# Patient Record
Sex: FEMALE | Race: WHITE | URBAN - METROPOLITAN AREA
[De-identification: names, ages, dates, MRNs, and addresses within clinical notes are randomized per-mention and may not be internally consistent; named-entity substitution may affect disease eponyms.]

---

## 2017-05-03 ENCOUNTER — OFFICE VISIT (OUTPATIENT)
Dept: ORTHOPEDIC SURGERY | Age: 22
End: 2017-05-03

## 2017-05-03 VITALS
SYSTOLIC BLOOD PRESSURE: 127 MMHG | BODY MASS INDEX: 22.15 KG/M2 | HEART RATE: 76 BPM | WEIGHT: 125 LBS | HEIGHT: 63 IN | DIASTOLIC BLOOD PRESSURE: 88 MMHG

## 2017-05-03 DIAGNOSIS — M79.605 LEFT LEG PAIN: ICD-10-CM

## 2017-05-03 DIAGNOSIS — M25.552 LEFT HIP PAIN: Primary | ICD-10-CM

## 2017-05-03 PROCEDURE — 99203 OFFICE O/P NEW LOW 30 MIN: CPT | Performed by: PHYSICIAN ASSISTANT

## 2017-05-03 PROCEDURE — 73590 X-RAY EXAM OF LOWER LEG: CPT | Performed by: PHYSICIAN ASSISTANT

## 2017-05-03 PROCEDURE — 73502 X-RAY EXAM HIP UNI 2-3 VIEWS: CPT | Performed by: PHYSICIAN ASSISTANT

## 2017-05-03 RX ORDER — DICLOFENAC SODIUM 75 MG/1
75 TABLET, DELAYED RELEASE ORAL 2 TIMES DAILY WITH MEALS
Qty: 60 TABLET | Refills: 0 | Status: SHIPPED | OUTPATIENT
Start: 2017-05-03 | End: 2017-06-21

## 2017-05-09 ENCOUNTER — HOSPITAL ENCOUNTER (OUTPATIENT)
Dept: PHYSICAL THERAPY | Facility: MEDICAL CENTER | Age: 22
Discharge: HOME | End: 2017-05-31
Admitting: PHYSICIAN ASSISTANT

## 2017-06-21 ENCOUNTER — OFFICE VISIT (OUTPATIENT)
Dept: ORTHOPEDIC SURGERY | Age: 22
End: 2017-06-21

## 2017-06-21 VITALS — HEIGHT: 63 IN | BODY MASS INDEX: 22.15 KG/M2 | WEIGHT: 125 LBS

## 2017-06-21 DIAGNOSIS — M70.62 GREATER TROCHANTERIC BURSITIS, LEFT: Primary | ICD-10-CM

## 2017-06-21 PROBLEM — M70.60 GREATER TROCHANTERIC BURSITIS: Status: ACTIVE | Noted: 2017-06-21

## 2017-06-21 PROCEDURE — 20611 DRAIN/INJ JOINT/BURSA W/US: CPT | Performed by: ORTHOPAEDIC SURGERY

## 2017-06-21 PROCEDURE — 99214 OFFICE O/P EST MOD 30 MIN: CPT | Performed by: ORTHOPAEDIC SURGERY

## 2022-11-18 ENCOUNTER — OFFICE VISIT (OUTPATIENT)
Dept: ORTHOPEDIC SURGERY | Age: 27
End: 2022-11-18
Payer: COMMERCIAL

## 2022-11-18 VITALS — BODY MASS INDEX: 22.15 KG/M2 | WEIGHT: 125 LBS | HEIGHT: 63 IN

## 2022-11-18 DIAGNOSIS — M79.672 LEFT FOOT PAIN: Primary | ICD-10-CM

## 2022-11-18 DIAGNOSIS — M25.561 ACUTE PAIN OF RIGHT KNEE: ICD-10-CM

## 2022-11-18 DIAGNOSIS — M22.41 RUNNER'S KNEE, RIGHT: ICD-10-CM

## 2022-11-18 PROCEDURE — 99204 OFFICE O/P NEW MOD 45 MIN: CPT | Performed by: ORTHOPAEDIC SURGERY

## 2022-11-18 RX ORDER — LIDOCAINE 50 MG/G
OINTMENT TOPICAL
COMMUNITY
Start: 2018-01-15 | End: 2022-11-18

## 2022-11-18 RX ORDER — DROSPIRENONE AND ETHINYL ESTRADIOL 0.03MG-3MG
1 KIT ORAL DAILY
COMMUNITY
Start: 2018-01-15 | End: 2022-11-18

## 2022-11-18 NOTE — PROGRESS NOTES
Jenn Kayleigh today for evaluation of 2 issues the first is her right knee and the second is her left foot. She is an avid runner. She had a hamstring injury in August and September and was able to return to running in October. She runs [employer] on October 9. She then rested and crosstraining up after running at the end of October. In early November she felt pain in her right knee and it feels like it locks or catches during her runs. It can be very very painful. Shortly thereafter her left foot began hurting. She has pain on the dorsum of the foot especially when wearing shoes. It feels very similar to an injury she had in August of last year that was diagnosed as bursitis and a \"prestress fracture\". She has changed her shoes and that seems to have helped some but when she goes back to her newer shoes, she has difficulty. She has been using KT tape. She is been seeing a chiropractor but continues to have difficulty. She is otherwise very healthy. 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 History: Patient's relevant past family, medical, and social history are reviewed as part of today's visit. ROS of pertinent positives and negatives as above; otherwise negative. General Exam:    Vitals: Height 5' 3\" (1.6 m), weight 125 lb (56.7 kg). Constitutional: Patient is adequately groomed with no evidence of malnutrition  Mental Status: The patient is oriented to time, place and person. The patient's mood and affect are appropriate. Gait:  Patient walks with normal gait and station. Lymphatic: The lymphatic examination bilaterally reveals all areas to be without enlargement or induration. Vascular: Examination reveals no swelling or calf tenderness. Peripheral pulses are palpable and 2+. Neurological: The patient has good coordination. There is no weakness or sensory deficit. Skin:    Head/Neck: inspection reveals no rashes, ulcerations or lesions.   Trunk:  inspection reveals no rashes, ulcerations or lesions. Right Lower Extremity: inspection reveals no rashes, ulcerations or lesions. Left Lower Extremity: inspection reveals no rashes, ulcerations or lesions. Examination of the bilateral hips reveals normal flexion and extension. There is no restriction in rotation. There is no tenderness to palpation anteriorly posteriorly or laterally. Left knee examination demonstrates no effusion. There is no tenderness to palpation over the medial or lateral joint line. There is no discomfort over the patellar tendon. There is no palpable popliteal cyst.  Sensation is intact. Range of motion is normal.  There is no patellofemoral crepitation. There is no instability to varus or valgus stress applied at 0, 30, 60, or 90Â° of flexion. There is no anterior or posterior drawer. Lachman examination is normal.    Right knee has no crepitation. She has irritability over the patella and pain with terminal extension but no effusion. Calf is soft. She is stable. Right foot exam is entirely normal.    Left foot has some very mild discomfort over the second metatarsal but no swelling and no bruising. Calf is soft. Neurologic and vascular exams are normal.  X-rays were obtained today in the office and interpreted by me. AP standing, PA flexed, and merchant views of the bilateral knees as well as a lateral of the right knee. These demonstrate: No bony abnormalities    Left foot x-rays obtained today in the office and interpreted by me AP, lateral and oblique views demonstrate: No bony abnormalities. I reviewed with her her MRI scan report from [facility] dated August 2021 which showed intermetatarsal bursitis and some soft tissue contusion but no bony abnormalities. Assessment: Right-sided runner's knee and left-sided dorsal foot pain. Plan: She is going to cross train. She will see physical therapy with an emphasis on quad strengthening.     She really only trains from a cardiovascular standpoint I think at this point coming off hamstring injury and being a high-level distance runner she is lacking quad strength which is leading to patellofemoral pain. She understands this and is in agreement with this plan. Follow-up with me in 6 weeks. Persistent pain at that time would warrant an MRI.

## 2022-11-30 ENCOUNTER — HOSPITAL ENCOUNTER (OUTPATIENT)
Dept: PHYSICAL THERAPY | Age: 27
Setting detail: THERAPIES SERIES
Discharge: HOME | End: 2022-11-30
Payer: COMMERCIAL

## 2022-11-30 PROCEDURE — 97530 THERAPEUTIC ACTIVITIES: CPT | Performed by: PHYSICAL THERAPIST

## 2022-11-30 PROCEDURE — 97110 THERAPEUTIC EXERCISES: CPT | Performed by: PHYSICAL THERAPIST

## 2022-11-30 PROCEDURE — 97161 PT EVAL LOW COMPLEX 20 MIN: CPT | Performed by: PHYSICAL THERAPIST

## 2022-11-30 NOTE — FLOWSHEET NOTE
[facility] a Department of [address]  Cedrick Oliveira [place] [address], [address]  Office: [phone]  Fax:  [phone]                                                      Physical Therapy Treatment Note/ Progress Report:      Date:  2022    Patient Name:  Andrew Reagan    :  1995  MRN: [id number]  Restrictions/Precautions:    Medical/Treatment Diagnosis Information:  Diagnosis: M22.41 (ICD-10-CM) - Runner's knee, right  Treatment Diagnosis: Right knee pain Z05.051  Insurance/Certification information:  PT Insurance Information: [facility]  Physician Information:  Referring Provider (secondary): Kimberly  Has the plan of care been signed (Y/N):        []  Yes  [x]  No     Date of Patient follow up with Physician: 3 Richard 2023      Is this a Progress Report:     []  Yes  [x]  No        If Yes:  Date Range for reporting period:  Beginning 2022  Ending    Progress report will be due (10 Rx or 30 days whichever is less): 28 Dec 2022             Visit # Insurance Allowable Auth Required   1 (8 elsewhere) 20 []  Yes []  No          Functional Scale: Edson Painter   Date assessed: 2022      Latex Allergy:  [x]NO      []YES  Preferred Language for Healthcare:   [x]English       []other:      Pain level:  See eval     SUBJECTIVE:  See eval    OBJECTIVE: See eval  Observation:   Test measurements:          Flexibility L R Comment   Hamstring      Gastroc      ITB      Quad                ROM PROM AROM Overpressure Comment    L R L R L R    Flexion          Extension                                  Strength L R Comment   Quad      Hamstring      Gastroc      Hip flexor      Hip ABD                          RESTRICTIONS/PRECAUTIONS: h/o pelvic stress fracture 2017; Covid- no long term effects; Pt is a vegan; h/o eating disorder; Pt does have  h/o eating disorder and h/o stress fracture.   She also has h/o amenorrhea for about 5 years.  She is vegan. All of these things could contribute to RED-S        Exercises/Interventions:     Exercise/Equipment Resistance and Repetitions Other comments   Stretching     Hamstring     Hip Flexion     ITB- Rope 3x:30    Grion     Quad     Inclined Calf     Towel Pull     Piriformis                    SLR     Supine 3x10 1# Slight quad lag noted   Prone     Abduction 3x10    Adducton     SLR+     clams Reviewed for HEP         Isometrics     Quad sets     Ball Squeezes     Glut sets Reviewed for HEP    Patellar Glides     Medial     Superior     Inferior          ROM     Passive     Active     Weight Shift     Hang Weights     Sheet Pulls     Ankle Pumps          CKC     Calf raises     Wall sits circuit 3x red  Wall sit with red loop x :30  1 band walk pillar to pillar    Step ups     1 leg stand     Squatting     CC TKE     Balance     Monster Walks     Bridging- SL with hip ABD 3x5    Triple threats     Stool Scoots     PRE     Extension  RANGE:   Flexion  RANGE:        Cable Column          Leg Press  RANGE:        Bike     Treadmill                  Therapeutic Exercise and NMR EXR  [x] ([id number]) Provided verbal/tactile cueing for activities related to strengthening, flexibility, endurance, ROM for improvements in LE, proximal hip, and core control with self care, mobility, lifting, ambulation. [x] (81674) Provided verbal/tactile cueing for activities related to improving balance, coordination, kinesthetic sense, posture, motor skill, proprioception  to assist with LE, proximal hip, and core control in self care, mobility, lifting, ambulation and eccentric single leg control.      NMR and Therapeutic Activities:    [x] (68709 or 55974) Provided verbal/tactile cueing for activities related to improving balance, coordination, kinesthetic sense, posture, motor skill, proprioception and motor activation to allow for proper function of core, proximal hip and LE with self care and ADLs  [x] (39603) Gait Re-education- Provided training and instruction to the patient for proper LE, core and proximal hip recruitment and positioning and eccentric body weight control with ambulation re-education including up and down stairs     Home Exercise Program:    [x] (27382) Reviewed/Progressed HEP activities related to strengthening, flexibility, endurance, ROM of core, proximal hip and LE for functional self-care, mobility, lifting and ambulation/stair navigation   [x] (75087)Reviewed/Progressed HEP activities related to improving balance, coordination, kinesthetic sense, posture, motor skill, proprioception of core, proximal hip and LE for self care, mobility, lifting, and ambulation/stair navigation      Access Code: [email or web address]  URL: [email or web address]  Date: 11/30/2022  Prepared by: Franko Clark Cessna    Exercises  Supine ITB Stretch with Strap - 2 x daily - 7 x weekly - 3-5 reps - 30 hold  Sidelying IT Band Foam Roll Mobilization - 1-2 x daily - 7 x weekly  Supine Active Straight Leg Raise - 1-2 x daily - 7 x weekly - 3 sets - 10 reps  Sidelying Hip Abduction - 1-2 x daily - 7 x weekly - 3 sets - 10 reps  Clamshell with Resistance - 1-2 x daily - 7 x weekly - 3 sets - 10 reps  Single Leg Bridge - 7 x weekly - 3 sets - 10 reps  Wall Squat - 1 x daily - 3-7 x weekly - 3-5 reps - 30-60 hold  Sidestepping in Squat with Resistance and Arms Forward - 1 x daily - 3-7 x weekly - 3 sets - 10 reps      Manual Treatments:  PROM / STM / Oscillations-Mobs:  G-I, II, III, IV (PA's, Inf., Post.)  [x] (57957) Provided manual therapy to mobilize LE, proximal hip and/or LS spine soft tissue/joints for the purpose of modulating pain, promoting relaxation,  increasing ROM, reducing/eliminating soft tissue swelling/inflammation/restriction, improving soft tissue extensibility and allowing for proper ROM for normal function with self care, mobility, lifting and ambulation.      Other:  Patient education on PT and plan of care including diagnosis, prognosis, treatment goals and options. Patient agrees with discussed POC and treatment and is aware of rehab process. Pt was also educated on clinic layout and use of modalities. PT gave pt business card to call if any questions. Modalities: declined    Charges:   Timed Code Treatment Minutes: 35'   Total Treatment Minutes: 75     [x] EVAL (LOW) 19245   [] EVAL (MOD) 79911   [] EVAL (HIGH) 24776   [] RE-EVAL   [x] BT(56080) x   1  [] IONTO  [] NMR (54271) x     [] VASO  [] Manual (46460) x      [] Other:  [x] TA x  1    [] Mech Traction (65926)  [] ES(attended) (08641)      [] ES (un) (38828):     GOALS:   Patient stated goal: return to running without c/o pain    [] Progressing: [] Met: [] Not Met: [] Adjusted     Therapist goals for Patient:   Short Term Goals: To be achieved in: 2 weeks  1. Independent in HEP and progression per patient tolerance, in order to prevent re-injury. [] Progressing: [] Met: [] Not Met: [] Adjusted   2. Pt will report pain at worst less than or equal to 5/10. [] Progressing: [] Met: [] Not Met: [] Adjusted     Long Term Goals: To be achieved in: 8 weeks  1. Pt will demo a score 85 or better for FOTO to assist with reaching prior level of function. [] Progressing: [] Met: [] Not Met: [] Adjusted  2. Patient will demonstrate negative Lili's to allow for proper joint functioning as indicated by patients Functional Deficits. [] Progressing: [] Met: [] Not Met: [] Adjusted  3. Patient will demonstrate an increase in strength to hip flex, hip ABD, and knee flex and ext strength 4+ to allow for proper functional mobility as indicated by patients functional deficits. [] Progressing: [] Met: [] Not Met: [] Adjusted  4. Patient will return to running 5 miles continuously without c/o pain. [] Progressing: [] Met: [] Not Met: [] Adjusted  5. Pt will report pain at worst less than or equal to 2/10. [] Progressing: [] Met: [] Not Met: [] Adjusted       6.  Pt will amb up/down 1 FOS with reciprocal gt without c/o pain. [] Progressing: [] Met: [] Not Met: [] Adjusted    Overall Progression Towards Functional goals/ Treatment Progress Update:  [] Patient is progressing as expected towards functional goals listed. [] Progression is slowed due to complexities/Impairments listed. [] Progression has been slowed due to co-morbidities. [x] Plan just implemented, too soon to assess goals progression <30days   [] Goals require adjustment due to lack of progress  [] Patient is not progressing as expected and requires additional follow up with physician  [] Other    Prognosis for POC: [x] Good [] Fair  [] Poor      Patient requires continued skilled intervention: [x] Yes  [] No    Treatment/Activity Tolerance:  [x] Patient able to complete treatment  [] Patient limited by fatigue  [] Patient limited by pain     [] Patient limited by other medical complications  [] Other: Pt is a 33 y/o female presenting with diagnosis of right runner's knee from the MD.  Clinically, the pt presents with decreased ROM, decreased strength, decreased function, and increased pain consistent with the MD diagnosis. The pt would benefit from skilled PT to return to [facility]. Pt has had a series of running related injuries recently. She has been trying to cross train. She does have h/o an eating disorder and is vegan. She does not report altered cycles currently, but she does report amenorrhea of about 5 years. She also reports h/o pelvic stress fracture. This could all contribute to RED-S. We did discuss utilizing a nutritionist/dietician, which I did say I could connect her with. Pt does demo significant weakness and decreased endurance particularly in hip ABD. She was challenged with the circuit today. She does have a deductible remaining, which could affect her ability to come to PT. We did discuss options for this as well. We did review insurance benefits.   All of pt's questions were answered. Pt was agreeable. PLAN: If pt doesn't return, this note can be considered a D/C note.   See eval  [] Continue per plan of care [] Alter current plan (see comments above)  [x] Plan of care initiated [] Hold pending MD visit [] Discharge            Electronically signed by:  Natalie Hogan PT, DPT, Board-Certified Clinical Specialist in Orthopaedic Physical Therapy [id number]

## 2022-11-30 NOTE — PLAN OF CARE
[facility] a Department of [address]  [place] [address], [address]  Office: [phone]  Fax:  [phone]                                                          Physical Therapy Certification    Dear Referring Provider (secondary): Kimberly,    We had the pleasure of evaluating the following patient for physical therapy services at [address] [place] [address]. A summary of our findings can be found in the initial assessment below. This includes our plan of care. If you have any questions or concerns regarding these findings, please do not hesitate to contact me at the office phone number checked above. Thank you for the referral.       Physician Signature:_______________________________Date:__________________  By signing above (or electronic signature), therapists plan is approved by physician      Patient: Andrew Reagan   : 1995   MRN: [id number]  Referring Physician: Referring Provider (secondary): Kimberly      Evaluation Date: 2022      Medical Diagnosis Information:  Diagnosis: M22.41 (ICD-10-CM) - Runner's knee, right   Treatment Diagnosis: Right knee pain M25.561                                           Precautions/ Contra-indications: h/o pelvic stress fracture ; Covid- no long term effects; Pt is a vegan; h/o eating disorder  C-SSRS Triggered by Intake questionnaire (Past 2 wk assessment):   [x] No, Questionnaire did not trigger screening.   [] Yes, Patient intake triggered further evaluation      [] C-SSRS Screening completed  [] PCP notified via Plan of Care  [] Emergency services notified   Latex Allergy:  [x]NO      []YES  Preferred Language for Healthcare:   [x]English       []other:    SUBJECTIVE: Patient stated complaint: Pt reports that she is prone to injury. She started running marathons in 2019. She had some glut stuff last year. Then her left foot was irritated. She was put In a boot in Aug.  She trained in 6 wks for a marathon. She did [place]. She then started to switch to get 5K time improved. She was going to run [employer] back to back. In late Aug, she had a deep right hamstring pull. She was starting a cut down run, and when she switched to drop her speed to race pace the pain started. She had that tx with DN at [facility]. She then had deep pain in her psoas before the hamstring. She then didn't run a whole lot. She got Covid before [place], and didn't run that. Then she ran [email or web address]. She ran a 5K (6:20 pace). Then she was walking to yoga, and had pain under her patella. She did Pilates fine. Then when she got to 2.5 mi, she felt her knee would lock up. It felt really stiff. She backed off, and then tested it. She had x-rays. She was having left foot pain, and then it improved as she has rested. She uses [employer] for training runs. She can lift, squat, [place] knee ext, stairs are fine when it's not flared up. She did a 4.5 mi race at 6:20 pace, but later in the day she was sore in the front of the knee. Then 2 days later, she couldn't run. She is currently running no more than 20 mi/wk. She did a 10K progression run without problems. She then didn't have pain for 5 days afterwards. She has been strength training 3x/wk. She does arms/shoulders- push ups/chin ups. Heavier lunge/squat days, leg press, OK[place] knee ext. This is usually 22' and then she will get on the elliptical.  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 Relevant Medical History:h/o pelvic stress fracture 2017; Covid- no long term effects; Pt is a vegan; h/o eating disorder  Functional Scale:  [id number]    Pain Scale: 0/10  Easing factors: not running, KT- seems to help, ice- helps, cross training. She does tape her knee for running. Provocative factors: running, particularly after running.   This was usually around 2.5 mi- locked up. 8/10 pain at worst.     Type: [] Constant   [x] Intermittent  [] Radiating [] Localized [] other:     Numbness/Tingling: none    Functional Limitations/Impairments: [] Sitting [] Standing [] Walking    [] Squatting/bending  [x] Stairs  -unless it's irritated         [] ADL's  [] Transfers [x] Sports/Recreation [] Other:    Occupation/School:      Living Status/Prior Level of Function: Independent with ADLs and IADLs. She is running [employer] in April.    (insert highest prior level of function)    40 mi/wk running. Works up to 60-65 mi/wk at her peak. Easy run pace is 8:15-8:30.  5K 6min/pace or less. Main goal is full. She has 3 marathons on next year ([place], [place], [place]). OBJECTIVE:     Joint mobility:    [x] Normal    [] Hypo   [] Hyper    Palpation: -TTP     Functional Mobility/Transfers: see above    Posture: FWD head    Bandages/Dressings/Incisions: NA- pt does have KT tape on    Gait: (include devices/WB status) WNL        Flexibility L R Comment   Hamstring      Gastroc      ITB  +    Quad                ROM PROM AROM Overpressure Comment    L R L R L R    Flexion   149 143      Extension   Hyper 2 Hyper 4                              Strength L R Comment   Quad 4+ 4+    Hamstring 4+ 4+    Gastroc      Hip flexor 4- 3+    Hip ABD 3+ 3+                  Orthopedic Special Tests:   Special Test Results/Comment   Meniscal Click    Crepitus    Flexion Test    Valgus Laxity    Varus Laxity    Lachmans    Drop Back    Homans    duckwal Normal without c/o pain. Girth L R   Mid Patella     Suprapatellar     5cm above     15cm above                                [x] Patient history, allergies, meds reviewed. Medical chart reviewed. See intake form. Review Of Systems (ROS):  [x]Performed Review of systems (Integumentary, CardioPulmonary, Neurological) by intake and observation. Intake form has been scanned into medical record.  Patient has been instructed to contact their primary care physician regarding ROS issues if not already being addressed at this time. Co-morbidities/Complexities (which will affect course of rehabilitation):   []None           Arthritic conditions   []Rheumatoid arthritis (M05.9)  []Osteoarthritis (M19.91)   Cardiovascular conditions   []Hypertension (I10)  []Hyperlipidemia (E78.5)  []Angina pectoris (I20)  []Atherosclerosis (I70)   Musculoskeletal conditions   []Disc pathology   []Congenital spine pathologies   []Prior surgical intervention  []Osteoporosis (M81.8)  []Osteopenia (M85.8)   Endocrine conditions   []Hypothyroid (E03.9)  []Hyperthyroid Gastrointestinal conditions   []Constipation (L97.26)   Metabolic conditions   []Morbid obesity (E66.01)  []Diabetes type 1(E10.65) or 2 (E11.65)   []Neuropathy (G60.9)     Pulmonary conditions   []Asthma (J45)  []Coughing   []COPD (J44.9)   Psychological Disorders  []Anxiety (F41.9)  []Depression (F32.9)   []Other:   [x]Other: Pt does have  h/o eating disorder and h/o stress fracture. She also has h/o amenorrhea for about 5 years. She is vegan. All of these things could contribute to RED-S          Barriers to/and or personal factors that will affect rehab potential:              [x]Age  []Sex              []Motivation/Lack of Motivation                        [x]Co-Morbidities              []Cognitive Function, education/learning barriers              []Environmental, home barriers              []profession/work barriers  [x]past PT/medical experience  []other:  Justification: Pt has had a series of running related injuries recently. She has been trying to cross train. She does have h/o an eating disorder and is vegan. She does not report altered cycles currently, but she does report amenorrhea of about 5 years. She also reports h/o pelvic stress fracture. This could all contribute to RED-S.   We did discuss utilizing a nutritionist/dietician, which I did say I could connect her with. Pt does demo significant weakness and decreased endurance particularly in hip ABD. She was challenged with the circuit today. She does have a deductible remaining, which could affect her ability to come to PT. We did discuss options for this as well. Falls Risk Assessment (30 days):   [x] Falls Risk assessed and no intervention required. [] Falls Risk assessed and Patient requires intervention due to being higher risk   TUG score (>12s at risk):     [] Falls education provided, including         ASSESSMENT:   Functional Impairments:     []Noted lumbar/proximal hip/LE joint hypomobility   []Decreased LE functional ROM   [x]Decreased core/proximal hip strength and neuromuscular control   [x]Decreased LE functional strength   []Reduced balance/proprioceptive control   []other:      Functional Activity Limitations (from functional questionnaire and intake)   []Reduced ability to tolerate prolonged functional positions   []Reduced ability or difficulty with changes of positions or transfers between positions   []Reduced ability to maintain good posture and demonstrate good body mechanics with sitting, bending, and lifting   []Reduced ability to sleep   [] Reduced ability or tolerance with driving and/or computer work   []Reduced ability to perform lifting, carrying tasks   []Reduced ability to squat   []Reduced ability to forward bend   []Reduced ability to ambulate prolonged functional periods/distances/surfaces   [x]Reduced ability to ascend/descend stairs- after runs   [x]Reduced ability to run, hop, cut or jump   []other:    Participation Restrictions   []Reduced participation in self care activities   []Reduced participation in home management activities   []Reduced participation in work activities   []Reduced participation in social activities. [x]Reduced participation in sport/recreation activities.     Classification :    []Signs/symptoms consistent with post-surgical status including decreased ROM, strength and function. []Signs/symptoms consistent with joint sprain/strain   []Signs/symptoms consistent with patella-femoral syndrome   []Signs/symptoms consistent with knee OA/hip OA   []Signs/symptoms consistent with internal derangement of knee/Hip   []Signs/symptoms consistent with functional hip weakness/NMR control      []Signs/symptoms consistent with tendinitis/tendinosis    []signs/symptoms consistent with pathology which may benefit from Dry needling      [x]other: Pt is a 31 y/o female presenting with diagnosis of right runner's knee from the MD.  Clinically, the pt presents with decreased ROM, decreased strength, decreased function, and increased pain consistent with the MD diagnosis. The pt would benefit from skilled PT to return to [facility]. Pt has had a series of running related injuries recently. She has been trying to cross train. She does have h/o an eating disorder and is vegan. She does not report altered cycles currently, but she does report amenorrhea of about 5 years. She also reports h/o pelvic stress fracture. This could all contribute to RED-S. We did discuss utilizing a nutritionist/dietician, which I did say I could connect her with. Pt does demo significant weakness and decreased endurance particularly in hip ABD. She was challenged with the circuit today. She does have a deductible remaining, which could affect her ability to come to PT. We did discuss options for this as well. We did review insurance benefits. All of pt's questions were answered. Pt was agreeable.        Prognosis/Rehab Potential:      []Excellent   [x]Good    []Fair   []Poor    Tolerance of evaluation/treatment:    []Excellent   [x]Good    []Fair   []Poor    PLAN  Frequency/Duration:  1-2 days per week for 6-8 Weeks:  Interventions:  [x]  Therapeutic exercise including: strength training, ROM, for Lower extremity and core   [x]  NMR activation and proprioception for LE, Glutes and Core [x]  Manual therapy as indicated for LE, Hip and spine to include: Dry Needling/IASTM, STM, PROM, Gr I-IV mobilizations, manipulation. [x] Modalities as needed that may include: thermal agents, E-stim, Biofeedback, US, iontophoresis as indicated  [x] Patient education on joint protection, postural re-education, activity modification, progression of HEP. HEP instruction: (see scanned forms)       GOALS:   Patient stated goal: return to running without c/o pain    [] Progressing: [] Met: [] Not Met: [] Adjusted    Therapist goals for Patient:   Short Term Goals: To be achieved in: 2 weeks  1. Independent in HEP and progression per patient tolerance, in order to prevent re-injury. [] Progressing: [] Met: [] Not Met: [] Adjusted   2. Pt will report pain at worst less than or equal to 5/10. [] Progressing: [] Met: [] Not Met: [] Adjusted    Long Term Goals: To be achieved in: 8 weeks  1. Pt will demo a score 85 or better for FOTO to assist with reaching prior level of function. [] Progressing: [] Met: [] Not Met: [] Adjusted  2. Patient will demonstrate negative Lili's to allow for proper joint functioning as indicated by patients Functional Deficits. [] Progressing: [] Met: [] Not Met: [] Adjusted  3. Patient will demonstrate an increase in strength to hip flex, hip ABD, and knee flex and ext strength 4+ to allow for proper functional mobility as indicated by patients functional deficits. [] Progressing: [] Met: [] Not Met: [] Adjusted  4. Patient will return to running 5 miles continuously without c/o pain. [] Progressing: [] Met: [] Not Met: [] Adjusted  5. Pt will report pain at worst less than or equal to 2/10. [] Progressing: [] Met: [] Not Met: [] Adjusted   6. Pt will amb up/down 1 FOS with reciprocal gt without c/o pain.   [] Progressing: [] Met: [] Not Met: [] Adjusted    Physical Therapy Evaluation Complexity Justification  [x] A history of present problem with:  [] no personal factors and/or comorbidities that impact the plan of care;  []1-2 personal factors and/or comorbidities that impact the plan of care  [x]3 personal factors and/or comorbidities that impact the plan of care  [x] An examination of body systems using standardized tests and measures addressing any of the following: body structures and functions (impairments), activity limitations, and/or participation restrictions;:  [] a total of 1-2 or more elements   [] a total of 3 or more elements   [x] a total of 4 or more elements   [x] A clinical presentation with:  [x] stable and/or uncomplicated characteristics   [] evolving clinical presentation with changing characteristics  [] unstable and unpredictable characteristics;   [x] Clinical decision making of [x] low, [] moderate, [] high complexity using standardized patient assessment instrument and/or measurable assessment of functional outcome.     [x] EVAL (LOW) 22279 (typically 20 minutes face-to-face)  [] EVAL (MOD) 42305 (typically 30 minutes face-to-face)  [] EVAL (HIGH) 91796 (typically 45 minutes face-to-face)  [] RE-EVAL [id number]    Electronically signed by:  Yoan Biswas, PT, DPT, Board-Certified Clinical Specialist in [address] [place] [address], [phone]

## 2022-12-09 ENCOUNTER — HOSPITAL ENCOUNTER (OUTPATIENT)
Dept: PHYSICAL THERAPY | Age: 27
Setting detail: THERAPIES SERIES
Discharge: HOME | End: 2022-12-09
Payer: COMMERCIAL

## 2022-12-09 PROCEDURE — 97112 NEUROMUSCULAR REEDUCATION: CPT | Performed by: PHYSICAL THERAPIST

## 2022-12-09 PROCEDURE — 97110 THERAPEUTIC EXERCISES: CPT | Performed by: PHYSICAL THERAPIST

## 2022-12-09 PROCEDURE — 97530 THERAPEUTIC ACTIVITIES: CPT | Performed by: PHYSICAL THERAPIST

## 2022-12-09 NOTE — FLOWSHEET NOTE
100 [facility] a Department of [address]  Nicole [address], [address]  Office: [phone]  Fax:  [phone]                                                      Physical Therapy Treatment Note/ Progress Report:      Date:  2022    Patient Name:  Jose Zarate    :  1995  MRN: [id number]  Restrictions/Precautions:    Medical/Treatment Diagnosis Information:  Diagnosis: M22.41 (ICD-10-CM) - Runner's knee, right  Treatment Diagnosis: Right knee pain B54.475  Insurance/Certification information:  PT Insurance Information: [facility]  Physician Information:  Referring Provider (secondary): Kimberly  Has the plan of care been signed (Y/N):        []  Yes  [x]  No     Date of Patient follow up with Physician: 3 Richard 2023      Is this a Progress Report:     []  Yes  [x]  No        If Yes:  Date Range for reporting period:  Beginning 2022  Ending    Progress report will be due (10 Rx or 30 days whichever is less): 28 Dec 2022             Visit # Insurance Allowable Auth Required    (8 elsewhere) 20  6 visits approved through 22 [x]  Yes []  No          Functional Scale: Coit Mons   Date assessed: 2022      Latex Allergy:  [x]NO      []YES  Preferred Language for Healthcare:   [x]English       []other:      Pain level:  0/10     SUBJECTIVE:  All her exercises yesterday hurt. She did the FR last.  After she was done with that, her knee felt a little better. The last 2 days were painful. She did a cardio workout on Tuesday that involved some plyos. Then the next day she tried to run, and it hurt starting at the first mile. Yesterday, she did her running group, and it hurt the whole time. She is doing the HEP. A compression sleeve hurt to have on her patella. So she went back to the tape. Her hamstring started hurting the other day when she was lifting. She scraped it and iced it.   It feels a little better. OBJECTIVE: See eval  Observation:   Test measurements:          Flexibility L R Comment   Hamstring      Gastroc      ITB      Quad                ROM PROM AROM Overpressure Comment    L R L R L R    Flexion          Extension                                  Strength L R Comment   Quad      Hamstring      Gastroc      Hip flexor      Hip ABD                          RESTRICTIONS/PRECAUTIONS: h/o pelvic stress fracture 2017; Covid- no long term effects; Pt is a vegan; h/o eating disorder; Pt does have  h/o eating disorder and h/o stress fracture. She also has h/o amenorrhea for about 5 years. She is vegan. All of these things could contribute to RED-S        Exercises/Interventions:     Exercise/Equipment Resistance and Repetitions Other comments   Stretching     Hamstring     Hip Flexion 3x:30    ITB- Rope 3x:30    Grion     Quad 3x:30    Inclined Calf     Towel Pull     Piriformis     Foam roll Beginning and end of tx- lateral hip, hip, and ITB              SLR     Supine 3x10 2# Slight quad lag noted   Prone 3x10 bilaterally off edge of table    Abduction 3x10 2#    Adducton     SLR+ ABD- :30 hold, 30x pumps, ABC     clams With half kneel side plank- 10x5# on right sidelying. 2x10 5# on left         Isometrics     [employer] Squeezes     Glut sets 5x ea way with release of right then left.     Patellar Glides     Medial     Superior     Inferior          ROM     Passive     Active     Weight Shift     Hang Weights     Sheet Pulls     Ankle Pumps          CKC     Calf raises     Wall sits circuit 3x red  Wall sit with red loop x :30  1 band walk pillar to pillar    Step ups     1 leg stand     Airplanes 5x ea way    Squatting     CC TKE     Balance     Monster Walks     Bridging- SL with hip ABD    Triple threats     Stool Scoots     Leg cycles 15x push away only    Side plank with hip ABD 5x ea side         PRE     Extension  RANGE:   Flexion 3x10 25# ECL RANGE:        [employer] Column          Leg Press  RANGE:        Bike     Treadmill                  Therapeutic Exercise and NMR EXR  [x] (46089) Provided verbal/tactile cueing for activities related to strengthening, flexibility, endurance, ROM for improvements in LE, proximal hip, and core control with self care, mobility, lifting, ambulation. [x] (63238) Provided verbal/tactile cueing for activities related to improving balance, coordination, kinesthetic sense, posture, motor skill, proprioception  to assist with LE, proximal hip, and core control in self care, mobility, lifting, ambulation and eccentric single leg control. NMR and Therapeutic Activities:    [x] (34255 or 21473) Provided verbal/tactile cueing for activities related to improving balance, coordination, kinesthetic sense, posture, motor skill, proprioception and motor activation to allow for proper function of core, proximal hip and LE with self care and ADLs  [x] (26712) Gait Re-education- Provided training and instruction to the patient for proper LE, core and proximal hip recruitment and positioning and eccentric body weight control with ambulation re-education including up and down stairs     Home Exercise Program:    [x] (77804) Reviewed/Progressed HEP activities related to strengthening, flexibility, endurance, ROM of core, proximal hip and LE for functional self-care, mobility, lifting and ambulation/stair navigation   [x] (59486)Reviewed/Progressed HEP activities related to improving balance, coordination, kinesthetic sense, posture, motor skill, proprioception of core, proximal hip and LE for self care, mobility, lifting, and ambulation/stair navigation      Access Code: [email or web address]  URL: [email or web address]  Date: 12/09/2022  Prepared by: Brie Stewart    Exercises  Supine ITB Stretch with Strap - 2 x daily - 7 x weekly - 3-5 reps - 30 hold  Standing Hip Flexor Stretch - 2 x daily - 7 x weekly - 3 reps - 30 hold  Prone Quadriceps Stretch with Strap - 2 x daily - 7 x weekly - 3 reps - 30 hold  Sidelying IT Band Foam Roll Mobilization - 1-2 x daily - 7 x weekly  Supine Active Straight Leg Raise - 1-2 x daily - 7 x weekly - 3 sets - 10 reps  Sidelying Hip Abduction - 1-2 x daily - 7 x weekly - 3 sets - 10 reps  Clamshell with Resistance - 1-2 x daily - 7 x weekly - 3 sets - 10 reps  Single Leg Bridge - 7 x weekly - 3 sets - 10 reps  Wall Squat - 1 x daily - 3-7 x weekly - 3-5 reps - 30-60 hold  Sidestepping in Squat with Resistance and Arms Forward - 1 x daily - 3-7 x weekly - 3 sets - 10 reps  Side Plank on Elbow with Hip Abduction - 1 x daily - 3-7 x weekly - 1-3 sets - 5-10 reps        Manual Treatments:  PROM / STM / Oscillations-Mobs:  G-I, II, III, IV (PA's, Inf., Post.)  [x] (97215) Provided manual therapy to mobilize LE, proximal hip and/or LS spine soft tissue/joints for the purpose of modulating pain, promoting relaxation,  increasing ROM, reducing/eliminating soft tissue swelling/inflammation/restriction, improving soft tissue extensibility and allowing for proper ROM for normal function with self care, mobility, lifting and ambulation. Other:        Modalities: ice to go    Charges:   Timed Code Treatment Minutes: 48'   Total Treatment Minutes: 65     [] EVAL (LOW) 46515   [] EVAL (MOD) 31681   [] EVAL (HIGH) 09837   [] RE-EVAL   [x] ZU(99377) x  2  [] IONTO  [x] NMR (55207) x 1    [] VASO  [] Manual (62016) x      [] Other:  [x] TA x  1    [] Mech Traction (95607)  [] ES(attended) (96085)      [] ES (un) (63413):     GOALS:   Patient stated goal: return to running without c/o pain    [] Progressing: [] Met: [] Not Met: [] Adjusted     Therapist goals for Patient:   Short Term Goals: To be achieved in: 2 weeks  1. Independent in HEP and progression per patient tolerance, in order to prevent re-injury. [] Progressing: [] Met: [] Not Met: [] Adjusted   2. Pt will report pain at worst less than or equal to 5/10.   [] Progressing: [] Met: [] Not Met: [] Adjusted     Long Term Goals: To be achieved in: 8 weeks  1. Pt will demo a score 85 or better for FOTO to assist with reaching prior level of function. [] Progressing: [] Met: [] Not Met: [] Adjusted  2. Patient will demonstrate negative Lili's to allow for proper joint functioning as indicated by patients Functional Deficits. [] Progressing: [] Met: [] Not Met: [] Adjusted  3. Patient will demonstrate an increase in strength to hip flex, hip ABD, and knee flex and ext strength 4+ to allow for proper functional mobility as indicated by patients functional deficits. [] Progressing: [] Met: [] Not Met: [] Adjusted  4. Patient will return to running 5 miles continuously without c/o pain. [] Progressing: [] Met: [] Not Met: [] Adjusted  5. Pt will report pain at worst less than or equal to 2/10. [] Progressing: [] Met: [] Not Met: [] Adjusted       6. Pt will amb up/down 1 FOS with reciprocal gt without c/o pain. [] Progressing: [] Met: [] Not Met: [] Adjusted    Overall Progression Towards Functional goals/ Treatment Progress Update:  [] Patient is progressing as expected towards functional goals listed. [] Progression is slowed due to complexities/Impairments listed. [] Progression has been slowed due to co-morbidities. [x] Plan just implemented, too soon to assess goals progression <30days   [] Goals require adjustment due to lack of progress  [] Patient is not progressing as expected and requires additional follow up with physician  [] Other    Prognosis for POC: [x] Good [] Fair  [] Poor      Patient requires continued skilled intervention: [x] Yes  [] No    Treatment/Activity Tolerance:  [x] Patient able to complete treatment  [] Patient limited by fatigue  [] Patient limited by pain     [] Patient limited by other medical complications  [] Other: Pt belle tx well. She did have knee pain when we attempted side plank with clams on right.   Thus, fewer reps were performed on that side.  We also trialed a hip flexor/quad stretch in supine, which was uncomfortable. Thus, we stopped this and did quad and hip flexor stretch. We also started to address her hamstring issue. She was challenged/fatigued by the progressions. We reviewed runner's knee diagnosis and how to maintain cardiovascular fitness. Pt was understanding. Pt would continue to benefit from skilled PT to improve flexibility, pain, strength, and function. PLAN: If pt doesn't return, this note can be considered a D/C note. Progress endurance strength per pt belle.    [x] Continue per plan of care [] Alter current plan (see comments above)  [] Plan of care initiated [] Hold pending MD visit [] Discharge            Electronically signed by:  Rosanna Mcgee PT, DPT, Board-Certified Clinical Specialist in Orthopaedic Physical Therapy [id number]

## 2022-12-15 ENCOUNTER — PATIENT MESSAGE (OUTPATIENT)
Dept: ORTHOPEDIC SURGERY | Age: 27
End: 2022-12-15

## 2022-12-15 DIAGNOSIS — M22.41 RUNNER'S KNEE, RIGHT: Primary | ICD-10-CM

## 2022-12-15 DIAGNOSIS — M25.561 ACUTE PAIN OF RIGHT KNEE: ICD-10-CM

## 2022-12-16 ENCOUNTER — TELEPHONE (OUTPATIENT)
Dept: PHYSICAL THERAPY | Age: 27
End: 2022-12-16

## 2022-12-16 NOTE — TELEPHONE ENCOUNTER
Left VM for pt as estimates were performed incorrectly. Left call back number with time that I'd be back in the office.

## 2022-12-19 ENCOUNTER — TELEPHONE (OUTPATIENT)
Dept: PHYSICAL THERAPY | Age: 27
End: 2022-12-19

## 2022-12-19 ENCOUNTER — HOSPITAL ENCOUNTER (OUTPATIENT)
Dept: PHYSICAL THERAPY | Age: 27
Setting detail: THERAPIES SERIES
End: 2022-12-19
Payer: COMMERCIAL

## 2022-12-19 NOTE — TELEPHONE ENCOUNTER
Pt returned call. I did go over the pt's estimate and apologized for it being run incorrectly. I did explain the billing system as well. We did decide to cancel the appointment for today as she is to get a MRI and review the results with the MD on Friday. Pt is understanding of this. Of note, she does state that amb downhill a lot causes more pain. She is frustrated, and her pain seems random with some days being better than others. I've told her to call me with any questions/concerns, and for now we will wait for the imaging results. Pt is agreeable.

## 2022-12-22 ENCOUNTER — OFFICE VISIT (OUTPATIENT)
Dept: ORTHOPEDIC SURGERY | Age: 27
End: 2022-12-22
Payer: COMMERCIAL

## 2022-12-22 VITALS — WEIGHT: 125 LBS | BODY MASS INDEX: 22.15 KG/M2 | HEIGHT: 63 IN

## 2022-12-22 DIAGNOSIS — M22.41 RUNNER'S KNEE, RIGHT: Primary | ICD-10-CM

## 2022-12-22 DIAGNOSIS — M25.561 ACUTE PAIN OF RIGHT KNEE: ICD-10-CM

## 2022-12-22 PROCEDURE — 99213 OFFICE O/P EST LOW 20 MIN: CPT | Performed by: ORTHOPAEDIC SURGERY

## 2022-12-22 NOTE — PROGRESS NOTES
Sal Greco returns today for her right knee. She is frustrated with consistent dull aching pain in the right knee that is about 3 out of 10. She went to a couple of therapy visits and says she has been doing her home exercise program but has not noticed improvement. We obtained an MRI. General Exam:    Vitals: Height 5' 3\" (1.6 m), weight 125 lb (56.7 kg). Constitutional: Patient is adequately groomed with no evidence of malnutrition  Mental Status: The patient is oriented to time, place and person. The patient's mood and affect are appropriate. Right knee exam is very benign today. I cannot elicit discomfort. Right knee MRI is reviewed. It demonstrates  FINDINGS:  There is no evidence of medial or lateral meniscal tear. The ACL, PCL, medial and lateral collateral ligaments are intact. The patellar tendon and quadriceps tendon of the extensor mechanism are intact with no tendon    tear and no substantial tendinosis. The hamstring tendons within the knee also demonstrate no    tendon tear and no substantial tendinosis. Subtle low- to moderate-grade chondromalacia is present within the patellofemoral compartment    involving the medial patellar facet (axial T2 fat-sat series 5, images 8 through 10 and axial    BSG water excitation series 6, images 10 through 12). No chondromalacia is apparent within the medial or lateral compartments. No knee effusion is present. A small Baker's cyst is present within the posteromedial aspect    of the knee. The muscles of the right knee are normal in appearance with no evidence of muscular strain,    fatty infiltration or atrophy. CONCLUSION:   1. 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 2. Small Baker's cyst.   3. No evidence of internal derangement within the right knee.    I reviewed these findings and the report and the images with the patient. I personally interpreted the scan. Assessment: In my opinion she does not have a structural anomaly in the knee that would warrant surgery. Plan: I believe she needs to continue with her therapy. We discussed the option of PRP at some point in the future if she has persistent symptoms. She will let us know. She says that she does not have any pain when she is spinning or using the elliptical or doing weight training and will continue to do those activities.

## 2022-12-28 ENCOUNTER — HOSPITAL ENCOUNTER (OUTPATIENT)
Dept: PHYSICAL THERAPY | Age: 27
Setting detail: THERAPIES SERIES
Discharge: HOME | End: 2022-12-28
Payer: COMMERCIAL

## 2022-12-28 PROCEDURE — 97110 THERAPEUTIC EXERCISES: CPT

## 2022-12-28 PROCEDURE — 97530 THERAPEUTIC ACTIVITIES: CPT

## 2022-12-28 PROCEDURE — 97112 NEUROMUSCULAR REEDUCATION: CPT

## 2022-12-28 NOTE — FLOWSHEET NOTE
100 [facility] Performance and Rehabilitation a Department of [address]  Cedrick Oliveira [place] [address], [address]  Office: [phone]  Fax:  [phone]                                                      Physical Therapy Treatment Note/ Progress Report:      Date:  2022    Patient Name:  Clinton Avelar    :  1995  MRN: [id number]  Restrictions/Precautions:    Medical/Treatment Diagnosis Information:  Diagnosis: M22.41 (ICD-10-CM) - Runner's knee, right  Treatment Diagnosis: Right knee pain A28.965  Insurance/Certification information:  PT Insurance Information: [facility]  Physician Information:  Referring Provider (secondary): Kimberly  Has the plan of care been signed (Y/N):        []  Yes  [x]  No     Date of Patient follow up with Physician: 3 Feb 2023      Is this a Progress Report:     []  Yes  [x]  No        If Yes:  Date Range for reporting period:  Beginning 2022  Ending    Progress report will be due (10 Rx or 30 days whichever is less): 28 Dec 2022             Visit # Insurance Allowable Auth Required   3/6 (8 elsewhere) 20  6 visits approved through 23 [x]  Yes []  No          Functional Scale: Willian Earnest   Date assessed: 2022      Latex Allergy:  [x]NO      []YES  Preferred Language for Healthcare:   [x]English       []other:      Pain level:  0/10     SUBJECTIVE:  Pt had f/u with MD last week to review MRI results. Pt states that MD would like pt to continue with skilled PT and if pain and dysfunction are still present by next f/u, 2/3/22, PRP may be warranted. Pt bought new compression sleeve that is solid sleeve without extra reinforcements and pt reports improved tolerance with donning brace during elliptical running and is wearing it consistently with gym activities.  Pt has stopped cycling over the past 2 weeks d/t suprapatellar ache that was experienced following spin classes and running  suggested holding on mechanics in wall plank; discussed as warm up before running with emphasis on core stability    Side plank with hip ABD         PRE     Extension  RANGE:   Flexion RANGE:        Cable Column          Leg Press  RANGE:        Bike     Treadmill                  Therapeutic Exercise and NMR EXR  [x] (42759) Provided verbal/tactile cueing for activities related to strengthening, flexibility, endurance, ROM for improvements in LE, proximal hip, and core control with self care, mobility, lifting, ambulation. [x] (55572) Provided verbal/tactile cueing for activities related to improving balance, coordination, kinesthetic sense, posture, motor skill, proprioception  to assist with LE, proximal hip, and core control in self care, mobility, lifting, ambulation and eccentric single leg control. NMR and Therapeutic Activities:    [x] (75273 or 81967) Provided verbal/tactile cueing for activities related to improving balance, coordination, kinesthetic sense, posture, motor skill, proprioception and motor activation to allow for proper function of core, proximal hip and LE with self care and ADLs  [x] (79814) Gait Re-education- Provided training and instruction to the patient for proper LE, core and proximal hip recruitment and positioning and eccentric body weight control with ambulation re-education including up and down stairs     Home Exercise Program:    [x] (23415) Reviewed/Progressed HEP activities related to strengthening, flexibility, endurance, ROM of core, proximal hip and LE for functional self-care, mobility, lifting and ambulation/stair navigation   [x] (90868)Reviewed/Progressed HEP activities related to improving balance, coordination, kinesthetic sense, posture, motor skill, proprioception of core, proximal hip and LE for self care, mobility, lifting, and ambulation/stair navigation      Access Code: [email or web address]  URL: [email or web address]  Date: 12/09/2022  Prepared by: Franko Gallego Cessna    Exercises  Supine ITB Stretch with Strap - 2 x daily - 7 x weekly - 3-5 reps - 30 hold  Standing Hip Flexor Stretch - 2 x daily - 7 x weekly - 3 reps - 30 hold  Prone Quadriceps Stretch with Strap - 2 x daily - 7 x weekly - 3 reps - 30 hold  Sidelying IT Band Foam Roll Mobilization - 1-2 x daily - 7 x weekly  Supine Active Straight Leg Raise - 1-2 x daily - 7 x weekly - 3 sets - 10 reps  Sidelying Hip Abduction - 1-2 x daily - 7 x weekly - 3 sets - 10 reps  Clamshell with Resistance - 1-2 x daily - 7 x weekly - 3 sets - 10 reps  Single Leg Bridge - 7 x weekly - 3 sets - 10 reps  Wall Squat - 1 x daily - 3-7 x weekly - 3-5 reps - 30-60 hold  Sidestepping in Squat with Resistance and Arms Forward - 1 x daily - 3-7 x weekly - 3 sets - 10 reps  Side Plank on Elbow with Hip Abduction - 1 x daily - 3-7 x weekly - 1-3 sets - 5-10 reps      12/28/22: Karo Singh, PTA: SAME CODE   Bridge in Hip Abduction and External Rotation - 1 x daily - 7 x weekly - 3 sets - 10 reps  Supine Hip Internal Rotation - 1 x daily - 7 x weekly - 3 sets - 10 reps  Marching with Resistance - 1 x daily - 7 x weekly - 3 sets - 10 reps  Standing Isometric Hip Abduction with Mini Squat and Ball on Wall - 1 x daily - 7 x weekly - 1-2 sets - 10 reps - 5sec hold        Manual Treatments:  PROM / STM / Oscillations-Mobs:  G-I, II, III, IV (PA's, Inf., Post.)  [] (68331) Provided manual therapy to mobilize LE, proximal hip and/or LS spine soft tissue/joints for the purpose of modulating pain, promoting relaxation,  increasing ROM, reducing/eliminating soft tissue swelling/inflammation/restriction, improving soft tissue extensibility and allowing for proper ROM for normal function with self care, mobility, lifting and ambulation.      Other:        Modalities:   Charges:   Timed Code Treatment Minutes: 40'   Total Treatment Minutes: 40'     [] EVAL (LOW) 87104   [] EVAL (MOD) 86577   [] EVAL (HIGH) 17050   [] RE-EVAL   [x] (41692) x  1  [] IONTO  [x] NMR (12968) x 1     [] VASO  [] Manual (63028) x      [] Other:  [x] TA x  1    [] Mech Traction (40408)  [] ES(attended) (98596)      [] ES (un) (25978):     GOALS:   Patient stated goal: return to running without c/o pain    [] Progressing: [] Met: [] Not Met: [] Adjusted     Therapist goals for Patient:   Short Term Goals: To be achieved in: 2 weeks  1. Independent in HEP and progression per patient tolerance, in order to prevent re-injury. [] Progressing: [] Met: [] Not Met: [] Adjusted   2. Pt will report pain at worst less than or equal to 5/10. [] Progressing: [] Met: [] Not Met: [] Adjusted     Long Term Goals: To be achieved in: 8 weeks  1. Pt will demo a score 85 or better for FOTO to assist with reaching prior level of function. [] Progressing: [] Met: [] Not Met: [] Adjusted  2. Patient will demonstrate negative Lili's to allow for proper joint functioning as indicated by patients Functional Deficits. [] Progressing: [] Met: [] Not Met: [] Adjusted  3. Patient will demonstrate an increase in strength to hip flex, hip ABD, and knee flex and ext strength 4+ to allow for proper functional mobility as indicated by patients functional deficits. [] Progressing: [] Met: [] Not Met: [] Adjusted  4. Patient will return to running 5 miles continuously without c/o pain. [] Progressing: [] Met: [] Not Met: [] Adjusted  5. Pt will report pain at worst less than or equal to 2/10. [] Progressing: [] Met: [] Not Met: [] Adjusted       6. Pt will amb up/down 1 FOS with reciprocal gt without c/o pain. [] Progressing: [] Met: [] Not Met: [] Adjusted    Overall Progression Towards Functional goals/ Treatment Progress Update:  [] Patient is progressing as expected towards functional goals listed. [] Progression is slowed due to complexities/Impairments listed. [] Progression has been slowed due to co-morbidities.   [x] Plan just implemented, too soon to assess goals progression <30days [] Goals require adjustment due to lack of progress  [] Patient is not progressing as expected and requires additional follow up with physician  [] Other    Prognosis for POC: [x] Good [] Fair  [] Poor      Patient requires continued skilled intervention: [x] Yes  [] No    Treatment/Activity Tolerance:  [x] Patient able to complete treatment  [] Patient limited by fatigue  [] Patient limited by pain     [] Patient limited by other medical complications  [] Other: Pt belle tx well. Exercises performed today with focus on glute facilitation and core posture. Did try to address strength deficits that were highlighted during initial eval and HEP was updated to give pt more resources for LE strengthening. Pt would like to return to running. Pt was advised to continue this week with elliptical and strengthening and then can begin gentle intervals next week. Pt was given [facility] [place] [address] program and was advised to start with \"Week 4\" with 2' running and to write down when/if pain was present. Pt should progress through program if they are able to complete 2 consecutive workouts without pain. Pt is understanding and will plan to bring in Running program at [facility] to discuss progressions or challenges experienced with intervals. Pt would continue to benefit from skilled PT to improve flexibility, pain, strength, and function. PLAN: If pt doesn't return, this note can be considered a D/C note. Progress endurance strength per pt belle.    [x] Continue per plan of care [] Alter current plan (see comments above)  [] Plan of care initiated [] Hold pending MD visit [] Discharge    Electronically signed by:  Jude Alejandro, PTA, [id number]

## 2023-01-05 ENCOUNTER — HOSPITAL ENCOUNTER (OUTPATIENT)
Dept: PHYSICAL THERAPY | Age: 28
Setting detail: THERAPIES SERIES
Discharge: HOME | End: 2023-01-05
Payer: COMMERCIAL

## 2023-01-05 PROCEDURE — 97112 NEUROMUSCULAR REEDUCATION: CPT | Performed by: PHYSICAL THERAPIST

## 2023-01-05 PROCEDURE — 97110 THERAPEUTIC EXERCISES: CPT | Performed by: PHYSICAL THERAPIST

## 2023-01-05 NOTE — PLAN OF CARE
100 [facility] a Department of [address]  Cedrick Oliveira [place] [address], [address]  Office: [phone]  Fax:  [phone]     Physical Therapy Re-Certification Plan of Care    Dear Dr. Samaria Bishop,    We had the pleasure of treating the following patient for physical therapy services at [address] [place] [address]. A summary of our findings can be found in the updated assessment below. This includes our plan of care. If you have any questions or concerns regarding these findings, please do not hesitate to contact me at the office phone number checked above. Thank you for the referral.     Physician Signature:________________________________Date:__________________  By signing above (or electronic signature), therapists plan is approved by physician    Date Range Of Visits: 11/30/22-1/5/2023  Total Visits to Date: 4  Overall Response to Treatment:   [] Patient is responding well to treatment and improvement is noted with regards to goals   [] Patient should continue to improve in reasonable time if they continue HEP   [] Patient has plateaued and is no longer responding to skilled PT intervention    [] Patient is getting worse and would benefit from return to referring MD   [] Patient unable to adhere to initial POC   [x] Other: Pt belle tx well. We worked on progressing some of her exercises. She was encouraged to work on glut med, glut max firing, and hamstring. Her glut med continues to be weak, and we discussed that it may be that she has pain after 20' due to the fatigue. She has been compliant with HEP. I've encouraged her to continue the return to run program and running every other day. She can do other cardio activities on her days off. I've stressed the importance of strengthening. Pt is understanding. Pt would continue to benefit from skilled PT to improve flexibility, pain, strength, and function.   Recommend continuing tx 1x/wk to every other week pending her schedule x6-8 wks. Physical Therapy Treatment Note/ Progress Report:      Date:  2023    Patient Name:  Rosita Shaikh    :  1995  MRN: [id number]  Restrictions/Precautions:    Medical/Treatment Diagnosis Information:  Diagnosis: M22.41 (ICD-10-CM) - Runner's knee, right  Treatment Diagnosis: Right knee pain J56.237  Insurance/Certification information:  PT Insurance Information: [place] [employer]  Physician Information:  Referring Provider (secondary): Kimberly  Has the plan of care been signed (Y/N):        []  Yes  [x]  No     Date of Patient follow up with Physician: 3 Feb 2023      Is this a Progress Report:     []  Yes  [x]  No        If Yes:  Date Range for reporting period:  Beginning 2022  Ending 2023    Progress report will be due (10 Rx or 30 days whichever is less): 2023            Visit # Insurance Allowable Auth Required   3/6 (8 elsewhere) 20  6 visits approved through 23-  No changes in  insurance [x]  Yes []  No          Functional Scale: [address]   Date assessed: 23      Latex Allergy:  [x]NO      []YES  Preferred Language for Healthcare:   [x]English       []other:      Pain level:  0/10     SUBJECTIVE:  After coming last week, she started with 2' run/1' walk and di 2 cycles of that without pain. She did 3' run/1' walk on Tuesday without pain. She is planning on trying it again today. She hasn't tested it with running. It still gets a little stiff. She is foam rolling, resting, and doing her PT. Her hamstring still pulls. She gets a pop/click when she does a hamstring stretch in the morning. She did have pain last week when she ran too much with her running group. This was about 2 miles. Her pain was about 7/10. She tried the AlterG, and she continued to have pain around 21' in. She can amb up/down stairs normally.        OBJECTIVE: 5 Jan 2023   Observation:   Test measurements:          Flexibility L R Comment   Hamstring      Gastroc      ITB      Quad                ROM PROM AROM Overpressure Comment    L R L R L R    Flexion          Extension                                  Strength L R Comment   Quad  4+    Hamstring  4+ with neutral, IR, and ER tibia    Gastroc      Hip flexor  4    Hip ABD  4- to 4                        RESTRICTIONS/PRECAUTIONS: h/o pelvic stress fracture 2017; Covid- no long term effects; Pt is a vegan; h/o eating disorder; Pt does have  h/o eating disorder and h/o stress fracture. She also has h/o amenorrhea for about 5 years. She is vegan.   All of these things could contribute to RED-S        Exercises/Interventions:     Exercise/Equipment Resistance and Repetitions Other comments   Stretching     Hamstring     Hip Flexion    ITB- Rope    Grion    Quad Mod Zheng EOB    Spider Lunge     Inclined Calf    Towel Pull    Piriformis    Foam roll              SLR     Supine Slight quad lag noted   Prone Flexed over table 2x10 ea    Abduction    Adducton     SLR+    clams         Isometrics     Quad sets     Ball Squeezes     Glut sets    Patellar Glides     Medial     Superior     Inferior          ROM     Passive     Active Supine 90/90 hip IR; unilateral 2x10 R/L     Weight Shift     Hang Weights     Sheet Pulls     Ankle Pumps          CKC     Calf raises     Wall sits circuit    Step ups     1 leg stand    Airplanes    Standing March in triple extension     Squatting 10x with red band with hip into ext/ABD    CC TKE     Balance     Monster Walks     Bridging-Hip ER     Triple threats     Stool Scoots     Leg cycles    Side plank with hip ABD    SB roll outs with hip ext/ABD 5x ea way    TRX bridge with hip ABD 2x5 each way    Quradruped with ball on wall with hip in ext 10x ea way         PRE     Extension  RANGE:   Flexion RANGE:        Cable Column          Leg Press 3x10 50# ECL RANGE: seat 3        Bike Treadmill                  Therapeutic Exercise and NMR EXR  [x] (71187) Provided verbal/tactile cueing for activities related to strengthening, flexibility, endurance, ROM for improvements in LE, proximal hip, and core control with self care, mobility, lifting, ambulation. [x] (11557) Provided verbal/tactile cueing for activities related to improving balance, coordination, kinesthetic sense, posture, motor skill, proprioception  to assist with LE, proximal hip, and core control in self care, mobility, lifting, ambulation and eccentric single leg control. NMR and Therapeutic Activities:    [x] (03707 or 37810) Provided verbal/tactile cueing for activities related to improving balance, coordination, kinesthetic sense, posture, motor skill, proprioception and motor activation to allow for proper function of core, proximal hip and LE with self care and ADLs  [x] (13185) Gait Re-education- Provided training and instruction to the patient for proper LE, core and proximal hip recruitment and positioning and eccentric body weight control with ambulation re-education including up and down stairs     Home Exercise Program:    [x] (15803) Reviewed/Progressed HEP activities related to strengthening, flexibility, endurance, ROM of core, proximal hip and LE for functional self-care, mobility, lifting and ambulation/stair navigation   [x] (72368)Reviewed/Progressed HEP activities related to improving balance, coordination, kinesthetic sense, posture, motor skill, proprioception of core, proximal hip and LE for self care, mobility, lifting, and ambulation/stair navigation      Access Code: [email or web address]  URL: [email or web address]  Date: 12/09/2022  Prepared by: Viji Stewart    Exercises  Supine ITB Stretch with Strap - 2 x daily - 7 x weekly - 3-5 reps - 30 hold  Standing Hip Flexor Stretch - 2 x daily - 7 x weekly - 3 reps - 30 hold  Prone Quadriceps Stretch with Strap - 2 x daily - 7 x weekly - 3 reps - 30 hold  Sidelying IT Band Foam Roll Mobilization - 1-2 x daily - 7 x weekly  Supine Active Straight Leg Raise - 1-2 x daily - 7 x weekly - 3 sets - 10 reps  Sidelying Hip Abduction - 1-2 x daily - 7 x weekly - 3 sets - 10 reps  Clamshell with Resistance - 1-2 x daily - 7 x weekly - 3 sets - 10 reps  Single Leg Bridge - 7 x weekly - 3 sets - 10 reps  Wall Squat - 1 x daily - 3-7 x weekly - 3-5 reps - 30-60 hold  Sidestepping in Squat with Resistance and Arms Forward - 1 x daily - 3-7 x weekly - 3 sets - 10 reps  Side Plank on Elbow with Hip Abduction - 1 x daily - 3-7 x weekly - 1-3 sets - 5-10 reps      12/28/22: Shirley Snyder, PTA: SAME CODE   Bridge in Hip Abduction and External Rotation - 1 x daily - 7 x weekly - 3 sets - 10 reps  Supine Hip Internal Rotation - 1 x daily - 7 x weekly - 3 sets - 10 reps  Marching with Resistance - 1 x daily - 7 x weekly - 3 sets - 10 reps  Standing Isometric Hip Abduction with Mini Squat and Ball on Wall - 1 x daily - 7 x weekly - 1-2 sets - 10 reps - 5sec hold        Manual Treatments:  PROM / STM / Oscillations-Mobs:  G-I, II, III, IV (PA's, Inf., Post.)  [] (92447) Provided manual therapy to mobilize LE, proximal hip and/or LS spine soft tissue/joints for the purpose of modulating pain, promoting relaxation,  increasing ROM, reducing/eliminating soft tissue swelling/inflammation/restriction, improving soft tissue extensibility and allowing for proper ROM for normal function with self care, mobility, lifting and ambulation.      Other:        Modalities:   Charges:   Timed Code Treatment Minutes: 25'   Total Treatment Minutes: 40'     [] EVAL (LOW) 455 1011   [] EVAL (MOD) 87509   [] EVAL (HIGH) 22451   [] RE-EVAL   [x] FV(43599) x  1  [] IONTO  [x] NMR (07440) x 1     [] VASO  [] Manual (07388) x      [] Other:  [] TA x      [] Mech Traction (45455)  [] ES(attended) (56647)      [] ES (un) (95240):     GOALS:   Patient stated goal: return to running without c/o pain    [] Progressing: [] Met: [] Not Met: [] Adjusted     Therapist goals for Patient:   Short Term Goals: To be achieved in: 2 weeks  1. Independent in HEP and progression per patient tolerance, in order to prevent re-injury. [] Progressing: [] Met: [] Not Met: [] Adjusted   2. Pt will report pain at worst less than or equal to 5/10. [] Progressing: [] Met: [] Not Met: [] Adjusted     Long Term Goals: To be achieved in: 8 weeks  1. Pt will demo a score 85 or better for FOTO to assist with reaching prior level of function. [] Progressing: [] Met: [] Not Met: [] Adjusted  2. Patient will demonstrate negative Lili's to allow for proper joint functioning as indicated by patients Functional Deficits. [] Progressing: [] Met: [] Not Met: [] Adjusted  3. Patient will demonstrate an increase in strength to hip flex, hip ABD, and knee flex and ext strength 4+ to allow for proper functional mobility as indicated by patients functional deficits. [] Progressing: [] Met: [] Not Met: [] Adjusted  4. Patient will return to running 5 miles continuously without c/o pain. [] Progressing: [] Met: [] Not Met: [] Adjusted  5. Pt will report pain at worst less than or equal to 2/10. [] Progressing: [] Met: [] Not Met: [] Adjusted       6. Pt will amb up/down 1 FOS with reciprocal gt without c/o pain. [] Progressing: [] Met: [] Not Met: [] Adjusted    Overall Progression Towards Functional goals/ Treatment Progress Update:  [] Patient is progressing as expected towards functional goals listed. [] Progression is slowed due to complexities/Impairments listed. [] Progression has been slowed due to co-morbidities.   [x] Plan just implemented, too soon to assess goals progression <30days   [] Goals require adjustment due to lack of progress  [] Patient is not progressing as expected and requires additional follow up with physician  [] Other    Prognosis for POC: [x] Good [] Fair  [] Poor      Patient requires continued skilled intervention: [x] Yes  [] No    Treatment/Activity Tolerance:  [x] Patient able to complete treatment  [] Patient limited by fatigue  [] Patient limited by pain     [] Patient limited by other medical complications  [] Other: Pt belle tx well. We worked on progressing some of her exercises. She was encouraged to work on glut med, glut max firing, and hamstring. Her glut med continues to be weak, and we discussed that it may be that she has pain after 20' due to the fatigue. She has been compliant with HEP. I've encouraged her to continue the return to run program and running every other day. She can do other cardio activities on her days off. I've stressed the importance of strengthening. Pt is understanding. Pt would continue to benefit from skilled PT to improve flexibility, pain, strength, and function. Recommend continuing tx 1x/wk to every other week pending her schedule x6-8 wks. PLAN: If pt doesn't return, this note can be considered a D/C note. Progress endurance strength per pt belle.   [x] Continue per plan of care [] Alter current plan (see comments above)  [] Plan of care initiated [] Hold pending MD visit [] Discharge    Electronically signed by:  Kenia Benavides, PT, DPT, Board-Certified Specialist in Orthopaedics [id number]

## 2023-01-11 ENCOUNTER — APPOINTMENT (OUTPATIENT)
Dept: PHYSICAL THERAPY | Age: 28
End: 2023-01-11
Payer: COMMERCIAL

## 2023-01-19 ENCOUNTER — HOSPITAL ENCOUNTER (OUTPATIENT)
Dept: PHYSICAL THERAPY | Age: 28
Setting detail: THERAPIES SERIES
Discharge: HOME | End: 2023-01-19
Payer: COMMERCIAL

## 2023-01-19 PROCEDURE — 97112 NEUROMUSCULAR REEDUCATION: CPT | Performed by: PHYSICAL THERAPIST

## 2023-01-19 PROCEDURE — 97110 THERAPEUTIC EXERCISES: CPT | Performed by: PHYSICAL THERAPIST

## 2023-01-19 NOTE — FLOWSHEET NOTE
100 [facility] Performance and Rehabilitation a Department of [address]  Cedrick Oliveira [place] [address], [address]  Office: [phone]  Fax:  [phone]                                                           Physical Therapy Treatment Note/ Progress Report:      Date:  2023    Patient Name:  Leonela De La Vega    :  1995  MRN: [id number]  Restrictions/Precautions:    Medical/Treatment Diagnosis Information:  Diagnosis: M22.41 (ICD-10-CM) - Runner's knee, right  Treatment Diagnosis: Right knee pain Y88.381  Insurance/Certification information:  PT Insurance Information: [facility]  Physician Information:  Referring Provider (secondary): Kimberly  Has the plan of care been signed (Y/N):        []  Yes  [x]  No     Date of Patient follow up with Physician: 3 Feb 2023      Is this a Progress Report:     []  Yes  [x]  No        If Yes:  Date Range for reporting period:  Beginning 2022  Ending 2023    Progress report will be due (10 Rx or 30 days whichever is less): 2023            Visit # Insurance Allowable Auth Required    (8 elsewhere) 20  6 visits approved through 23-  No changes in  insurance [x]  Yes []  No          Functional Scale: FOTO-76   Date assessed: 23      Latex Allergy:  [x]NO      []YES  Preferred Language for Healthcare:   [x]English       []other:      Pain level:  0/10     SUBJECTIVE:  She has been running 5 miles every other day. She can run continuously. She has been FR multiple times/day. She is doing band walks, warm ups, band marches. She has been warming up. Days that she rests, she doesn't lift. She had a sore butt after ECL leg press. She has been running 3-4 d/wk. She had some tension around her pes after the 5 mi run. She has been running without breaks for the last 2 weeks. She is booked for [employer] Her big race is [place] this fall.       She is running these runs at 8:30-8:50 besides on the runs that she runs with the group (7:20). Her knee wasn't starting to hurt, but her cardiovascular system is not where it was. She did do a tempo run on the treadmill on Tuesday. She did 2 mi at 7:08. She had a little pulling, but not pain. OBJECTIVE: 5 Jan 2023   Observation:   Test measurements:          Flexibility L R Comment   Hamstring      Gastroc      ITB      Quad                ROM PROM AROM Overpressure Comment    L R L R L R    Flexion          Extension                                  Strength L R Comment   Quad  4+    Hamstring  4+ with neutral, IR, and ER tibia    Gastroc      Hip flexor  4    Hip ABD  4- to 4                        RESTRICTIONS/PRECAUTIONS: h/o pelvic stress fracture 2017; Covid- no long term effects; Pt is a vegan; h/o eating disorder; Pt does have  h/o eating disorder and h/o stress fracture. She also has h/o amenorrhea for about 5 years. She is vegan.   All of these things could contribute to RED-S        Exercises/Interventions:     Exercise/Equipment Resistance and Repetitions Other comments   Stretching     Hamstring     Hip Flexion    ITB- Rope    Grion    Quad Mod Zheng EOB    Spider Lunge     Inclined Calf    Towel Pull    Piriformis    Foam roll              SLR     Supine Slight quad lag noted   Prone Flexed over table 2x10 ea    Abduction    Adducton     SLR+    clams         Isometrics     Quad sets     Ball Squeezes     Glut sets    Patellar Glides     Medial     Superior     Inferior          ROM     Passive     Active    Weight Shift     Hang Weights     Sheet Pulls     Ankle Pumps          CKC     Calf raises     Wall sits circuit    Step ups     1 leg stand Captain Delgado  With pistol squat 5x each side    Airplanes    Standing March in triple extension     Squatting    Figure 8 KB passes  2x10 10# ea way              CC TKE     Balance     Monster Walks     Bridging-Hip ER     Triple threats     Stool Scoots     Leg cycles    Side plank with hip ABD circles 5x ea side ea way    SB roll outs with hip ext/ABD 2x5 ea way    TRX bridge with hip ABD    Quradruped with ball on wall with hip in ext    Bird dogs with black band and hand on small RB Kake 10x each way         PRE     Extension  RANGE:   Flexion RANGE:        Cable Column          Leg Press RANGE: seat 3        Bike     Treadmill                  Therapeutic Exercise and NMR EXR  [x] (48250) Provided verbal/tactile cueing for activities related to strengthening, flexibility, endurance, ROM for improvements in LE, proximal hip, and core control with self care, mobility, lifting, ambulation. [x] (26546) Provided verbal/tactile cueing for activities related to improving balance, coordination, kinesthetic sense, posture, motor skill, proprioception  to assist with LE, proximal hip, and core control in self care, mobility, lifting, ambulation and eccentric single leg control.      NMR and Therapeutic Activities:    [x] (27644 or 78458) Provided verbal/tactile cueing for activities related to improving balance, coordination, kinesthetic sense, posture, motor skill, proprioception and motor activation to allow for proper function of core, proximal hip and LE with self care and ADLs  [x] (49309) Gait Re-education- Provided training and instruction to the patient for proper LE, core and proximal hip recruitment and positioning and eccentric body weight control with ambulation re-education including up and down stairs     Home Exercise Program:    [x] (13594) Reviewed/Progressed HEP activities related to strengthening, flexibility, endurance, ROM of core, proximal hip and LE for functional self-care, mobility, lifting and ambulation/stair navigation   [x] (74268)Reviewed/Progressed HEP activities related to improving balance, coordination, kinesthetic sense, posture, motor skill, proprioception of core, proximal hip and LE for self care, mobility, lifting, and ambulation/stair navigation      Access Code: [email or web address]  URL: [email or web address]  Date: 12/09/2022  Prepared by: Maria D Stewart    Exercises  Supine ITB Stretch with Strap - 2 x daily - 7 x weekly - 3-5 reps - 30 hold  Standing Hip Flexor Stretch - 2 x daily - 7 x weekly - 3 reps - 30 hold  Prone Quadriceps Stretch with Strap - 2 x daily - 7 x weekly - 3 reps - 30 hold  Sidelying IT Band Foam Roll Mobilization - 1-2 x daily - 7 x weekly  Supine Active Straight Leg Raise - 1-2 x daily - 7 x weekly - 3 sets - 10 reps  Sidelying Hip Abduction - 1-2 x daily - 7 x weekly - 3 sets - 10 reps  Clamshell with Resistance - 1-2 x daily - 7 x weekly - 3 sets - 10 reps  Single Leg Bridge - 7 x weekly - 3 sets - 10 reps  Wall Squat - 1 x daily - 3-7 x weekly - 3-5 reps - 30-60 hold  Sidestepping in Squat with Resistance and Arms Forward - 1 x daily - 3-7 x weekly - 3 sets - 10 reps  Side Plank on Elbow with Hip Abduction - 1 x daily - 3-7 x weekly - 1-3 sets - 5-10 reps      12/28/22: Javier Jiang, PTA: SAME CODE   Bridge in Hip Abduction and External Rotation - 1 x daily - 7 x weekly - 3 sets - 10 reps  Supine Hip Internal Rotation - 1 x daily - 7 x weekly - 3 sets - 10 reps  Marching with Resistance - 1 x daily - 7 x weekly - 3 sets - 10 reps  Standing Isometric Hip Abduction with Mini Squat and Ball on Wall - 1 x daily - 7 x weekly - 1-2 sets - 10 reps - 5sec hold        Manual Treatments:  PROM / STM / Oscillations-Mobs:  G-I, II, III, IV (PA's, Inf., Post.)  [] (03810) Provided manual therapy to mobilize LE, proximal hip and/or LS spine soft tissue/joints for the purpose of modulating pain, promoting relaxation,  increasing ROM, reducing/eliminating soft tissue swelling/inflammation/restriction, improving soft tissue extensibility and allowing for proper ROM for normal function with self care, mobility, lifting and ambulation.      Other:        Modalities:   Charges:  Timed Code Treatment Minutes: 25'   Total Treatment Minutes: 39'     [] VILMA ([facility]) 52082 [] EVAL (MOD) 72472   [] EVAL (HIGH) 67051   [] RE-EVAL   [x] NC(33611) x  1  [] IONTO  [x] NMR (20057) x 1     [] VASO  [] Manual (24713) x      [] Other:  [] TA x      [] Mech Traction (08880)  [] ES(attended) (65898)      [] ES (un) (04777):     GOALS:   Patient stated goal: return to running without c/o pain    [] Progressing: [] Met: [] Not Met: [] Adjusted     Therapist goals for Patient:   Short Term Goals: To be achieved in: 2 weeks  1. Independent in HEP and progression per patient tolerance, in order to prevent re-injury. [] Progressing: [] Met: [] Not Met: [] Adjusted   2. Pt will report pain at worst less than or equal to 5/10. [] Progressing: [] Met: [] Not Met: [] Adjusted     Long Term Goals: To be achieved in: 8 weeks  1. Pt will demo a score 85 or better for FOTO to assist with reaching prior level of function. [] Progressing: [] Met: [] Not Met: [] Adjusted  2. Patient will demonstrate negative Lili's to allow for proper joint functioning as indicated by patients Functional Deficits. [] Progressing: [] Met: [] Not Met: [] Adjusted  3. Patient will demonstrate an increase in strength to hip flex, hip ABD, and knee flex and ext strength 4+ to allow for proper functional mobility as indicated by patients functional deficits. [] Progressing: [] Met: [] Not Met: [] Adjusted  4. Patient will return to running 5 miles continuously without c/o pain. [] Progressing: [] Met: [] Not Met: [] Adjusted  5. Pt will report pain at worst less than or equal to 2/10. [] Progressing: [] Met: [] Not Met: [] Adjusted       6. Pt will amb up/down 1 FOS with reciprocal gt without c/o pain. [] Progressing: [] Met: [] Not Met: [] Adjusted    Overall Progression Towards Functional goals/ Treatment Progress Update:  [] Patient is progressing as expected towards functional goals listed. [] Progression is slowed due to complexities/Impairments listed.   [] Progression has been slowed due to co-morbidities. [x] Plan just implemented, too soon to assess goals progression <30days   [] Goals require adjustment due to lack of progress  [] Patient is not progressing as expected and requires additional follow up with physician  [] Other    Prognosis for POC: [x] Good [] Fair  [] Poor      Patient requires continued skilled intervention: [x] Yes  [] No    Treatment/Activity Tolerance:  [x] Patient able to complete treatment  [] Patient limited by fatigue  [] Patient limited by pain     [] Patient limited by other medical complications  [] Other: Pt belle tx well. She is progressing well and is increasing her mileage. We discussed adding one more day of running at about 2 miles. She can do this for the next week or two with no changes in the rest of her training. Then she will need to decrease her mileage by 20%. She is understanding of this. She will not add long runs until she gets to about 30-35 mi/wk. She is understanding of our plan. Pt would continue to benefit from skilled PT to improve flexibility, pain, strength, and function. PLAN: If pt doesn't return, this note can be considered a D/C note. Progress endurance strength per pt belle.    [x] Continue per plan of care [] Alter current plan (see comments above)  [] Plan of care initiated [] Hold pending MD visit [] Discharge    Electronically signed by:  Margot Delarosa PT, DPT, Board-Certified Specialist in Orthopaedics [id number]

## 2023-01-25 ENCOUNTER — APPOINTMENT (OUTPATIENT)
Dept: PHYSICAL THERAPY | Age: 28
End: 2023-01-25
Payer: COMMERCIAL

## 2023-02-03 ENCOUNTER — OFFICE VISIT (OUTPATIENT)
Dept: ORTHOPEDIC SURGERY | Age: 28
End: 2023-02-03
Payer: COMMERCIAL

## 2023-02-03 VITALS — BODY MASS INDEX: 22.15 KG/M2 | WEIGHT: 125 LBS | HEIGHT: 63 IN

## 2023-02-03 DIAGNOSIS — M22.41 RUNNER'S KNEE, RIGHT: Primary | ICD-10-CM

## 2023-02-03 PROCEDURE — 99212 OFFICE O/P EST SF 10 MIN: CPT | Performed by: ORTHOPAEDIC SURGERY

## 2023-02-03 NOTE — PROGRESS NOTES
Aida Harrison returns today for reevaluation of her right knee runner's knee. Generally she is doing very well. She is able to run 25 miles last week. She ran 4 miles last evening. She reports no pain currently but occasionally feels soreness in the superior medial knee. I spoke with her therapist who confirms that she is making excellent progress but still has some strength deficits. General Exam:    Vitals: Height 5' 3\" (1.6 m), weight 125 lb (56.7 kg). Constitutional: Patient is adequately groomed with no evidence of malnutrition  Mental Status: The patient is oriented to time, place and person. The patient's mood and affect are appropriate. Right knee today is benign. I cannot reproduce discomfort. Assessment: Improving right knee runner's knee. Plan: She will work with her therapist and transition back to full training load over the next several months. Follow-up with me on an as-needed basis.